# Patient Record
Sex: MALE | Race: WHITE | URBAN - METROPOLITAN AREA
[De-identification: names, ages, dates, MRNs, and addresses within clinical notes are randomized per-mention and may not be internally consistent; named-entity substitution may affect disease eponyms.]

---

## 2018-12-22 ENCOUNTER — HOSPITAL ENCOUNTER (EMERGENCY)
Facility: CLINIC | Age: 23
Discharge: HOME | End: 2018-12-23
Attending: PHYSICIAN ASSISTANT | Admitting: PHYSICIAN ASSISTANT
Payer: COMMERCIAL

## 2018-12-22 VITALS
TEMPERATURE: 98 F | SYSTOLIC BLOOD PRESSURE: 128 MMHG | DIASTOLIC BLOOD PRESSURE: 82 MMHG | RESPIRATION RATE: 18 BRPM | OXYGEN SATURATION: 100 %

## 2018-12-22 DIAGNOSIS — J30.2 SEASONAL ALLERGIC RHINITIS: ICD-10-CM

## 2018-12-22 DIAGNOSIS — K04.7 DENTAL ABSCESS: ICD-10-CM

## 2018-12-22 PROCEDURE — 99284 EMERGENCY DEPT VISIT MOD MDM: CPT | Mod: Z6 | Performed by: PHYSICIAN ASSISTANT

## 2018-12-22 PROCEDURE — 99283 EMERGENCY DEPT VISIT LOW MDM: CPT | Performed by: PHYSICIAN ASSISTANT

## 2018-12-22 NOTE — ED AVS SNAPSHOT
[facility] Emergency Department  [address] [place] [address]  Phone:  [phone]  Fax:  [phone]                                    Yeison Kumari   MRN: [id number]    Department:  [facility] Emergency Department   Date of Visit:  12/22/2018           After Visit Summary Signature Page    I have received my discharge instructions, and my questions have been answered. I have discussed any challenges I see with this plan with the nurse or doctor.    ..........................................................................................................................................  Patient/Patient Representative Signature      ..........................................................................................................................................  Patient Representative Print Name and Relationship to Patient    ..................................................               ................................................  Date                                   Time    ..........................................................................................................................................  Reviewed by Signature/Title    ...................................................              ..............................................  Date                                               Time          22EPIC Rev 08/18

## 2018-12-23 RX ORDER — FLUTICASONE PROPIONATE 50 MCG
2 SPRAY, SUSPENSION (ML) NASAL DAILY
Qty: 1 BOTTLE | Refills: 0 | Status: SHIPPED | OUTPATIENT
Start: 2018-12-23 | End: 2019-01-22

## 2018-12-23 RX ORDER — AMOXICILLIN 875 MG
875 TABLET ORAL 2 TIMES DAILY
Qty: 20 TABLET | Refills: 0 | Status: SHIPPED | OUTPATIENT
Start: 2018-12-23 | End: 2019-01-02

## 2018-12-23 RX ORDER — LORATADINE 10 MG/1
10 TABLET ORAL DAILY
Qty: 30 TABLET | Refills: 0 | Status: SHIPPED | OUTPATIENT
Start: 2018-12-23 | End: 2019-01-22

## 2018-12-23 NOTE — ED TRIAGE NOTES
Patient had left upper wisdom tooth pain started a couple of weeks ago, went away but returned yesterday.  Today patient expressed pus type material from the area.

## 2018-12-23 NOTE — ED PROVIDER NOTES
History     Chief Complaint   Patient presents with     Dental Pain     HPI  Yeison Kumari is a 23 year old male who presents for evaluation of left lower jaw wisdom tooth discomfort off and on for the past 3 weeks.  Much worse in the past 2 days.  He reports pushing around on his gum area, and was able to express some purulent drainage today.  Denies any facial swelling, fever, chills, nausea, or vomiting.  No prior history of dental abscess.  No trauma to the jaw.  He has not seen a dentist in over 6 years.          Problem List:    Patient Active Problem List    Diagnosis Date Noted     Acne 04/17/2014     Priority: Medium     Tourette disorder      Priority: Medium     Lazy eye      Priority: Medium     lt  (Problem list name updated by automated process. Provider to review and confirm.)       Abnormal involuntary movement 10/28/2006     Priority: Medium     Problem list name updated by automated process. Provider to review          Past Medical History:    Past Medical History:   Diagnosis Date     Lazy eyes      Tourette disorder        Past Surgical History:    Past Surgical History:   Procedure Laterality Date     NO HISTORY OF SURGERY         Family History:    Family History   Problem Relation Age of Onset     Family History Negative No family hx of         both parents adopted unsure of the history       Social History:  Marital Status:  Single [1]  Social History     Tobacco Use     Smoking status: Never Smoker     Smokeless tobacco: Never Used   Substance Use Topics     Alcohol use: No     Drug use: No        Medications:      amoxicillin (AMOXIL) 875 MG tablet   fluticasone (FLONASE) 50 MCG/ACT nasal spray   loratadine (CLARITIN) 10 MG tablet   benzoyl peroxide 10 % gel   tretinoin (RETIN-A) 0.05 % cream         Review of Systems   All other systems reviewed and are negative.      Physical Exam   BP: 128/82  Heart Rate: 84  Temp: 98  F (36.7  C)  Resp: 18  SpO2: 100 %      Physical Exam    Constitutional: He is oriented to person, place, and time. No distress.   HENT:   Head: Normocephalic and atraumatic.   Right Ear: External ear normal.   Left Ear: External ear normal.   Nose: Nose normal.   Mouth/Throat: Oropharynx is clear and moist. No oropharyngeal exudate.       Eyes: Conjunctivae and EOM are normal. Pupils are equal, round, and reactive to light. Right eye exhibits no discharge. Left eye exhibits no discharge. No scleral icterus.   Neck: Normal range of motion. Neck supple. No thyromegaly present.   Cardiovascular: Normal rate, regular rhythm and normal heart sounds.   No murmur heard.  Pulmonary/Chest: Effort normal and breath sounds normal. No respiratory distress. He has no wheezes. He has no rales. He exhibits no tenderness.   Abdominal: Soft. Bowel sounds are normal. He exhibits no distension and no mass. There is no tenderness. There is no rebound and no guarding.   Musculoskeletal: Normal range of motion. He exhibits no edema, tenderness or deformity.   Lymphadenopathy:     He has no cervical adenopathy.   Neurological: He is alert and oriented to person, place, and time. No cranial nerve deficit.   Skin: Skin is warm and dry. Capillary refill takes less than 2 seconds. No rash noted. He is not diaphoretic. No erythema.   Psychiatric: He has a normal mood and affect. His behavior is normal. Thought content normal.   Nursing note and vitals reviewed.      ED Course        Procedures               Critical Care time:  none               No results found for this or any previous visit (from the past 24 hour(s)).    Medications - No data to display    Assessments & Plan (with Medical Decision Making)     Dental abscess  Seasonal allergic rhinitis     23 year old male presents for evaluation of increasing left lower jaw wisdom tooth discomfort starting 3 weeks prior, but escalating in the past 2 days.  He was able to express some drainage into his mouth earlier today.  Denies any fever,  chills, nausea, vomiting, or facial swelling.  Has not seen a dentist in over 6 years.  Chronic allergic rhinitis issues, and hoping for advice on how to treat this.  On exam he is afebrile temperature 98.0.  Some gingival swelling over the erupting wisdom tooth in the left lower jaw, but no identifiable abscess that would require incision and drainage.  Exam otherwise normal.  Treatment with amoxicillin 875 mg twice daily for 10 days.  Warm compresses to be applied to the lower jaw for 20 minutes every 1-2 hours over the next 2-3 days.  Indications to return for repeat evaluation reviewed with him in detail.  Encouraged that he see a dentist in the next 2-3 weeks to provide more of a long-term solution for this issue.  Try Claritin and Flonase for treatment of his chronic allergic rhinitis.  Possible side effects discussed.  Indications for return reviewed.  Patient was in agreement with this plan and was suitable for discharge.     I have reviewed the nursing notes.    I have reviewed the findings, diagnosis, plan and need for follow up with the patient.          Medication List      Started    amoxicillin 875 MG tablet  Commonly known as:  AMOXIL  875 mg, Oral, 2 TIMES DAILY     fluticasone 50 MCG/ACT nasal spray  Commonly known as:  FLONASE  2 sprays, Both Nostrils, DAILY     loratadine 10 MG tablet  Commonly known as:  CLARITIN  10 mg, Oral, DAILY            Final diagnoses:   Dental abscess   Seasonal allergic rhinitis       Disclaimer: This note consists of symbols derived from keyboarding, dictation and/or voice recognition software. As a result, there may be errors in the script that have gone undetected. Please consider this when interpreting information found in this chart.      12/22/2018   Dez Solitario PA-C   [facility] EMERGENCY DEPARTMENT     Dez Solitario PA-C  12/23/18 0022

## 2018-12-23 NOTE — DISCHARGE INSTRUCTIONS
It was a pleasure working with you today!  I hope your condition improves rapidly!     Please start the amoxicillin this evening to treat the dental infection.  Please see a dentist in the next 2-3 weeks for this condition, as they are the only ones who can provide a long-term solution for this issue.  Please use a heating pad on the left lower jaw area for 20 minutes every 1-2 hours over the next couple days.

## 2019-10-01 ENCOUNTER — HEALTH MAINTENANCE LETTER (OUTPATIENT)
Age: 24
End: 2019-10-01

## 2021-01-15 ENCOUNTER — HEALTH MAINTENANCE LETTER (OUTPATIENT)
Age: 26
End: 2021-01-15

## 2021-09-04 ENCOUNTER — HEALTH MAINTENANCE LETTER (OUTPATIENT)
Age: 26
End: 2021-09-04

## 2022-02-19 ENCOUNTER — HEALTH MAINTENANCE LETTER (OUTPATIENT)
Age: 27
End: 2022-02-19

## 2022-10-22 ENCOUNTER — HEALTH MAINTENANCE LETTER (OUTPATIENT)
Age: 27
End: 2022-10-22

## 2023-04-01 ENCOUNTER — HEALTH MAINTENANCE LETTER (OUTPATIENT)
Age: 28
End: 2023-04-01